# Patient Record
Sex: FEMALE | Race: ASIAN | URBAN - METROPOLITAN AREA
[De-identification: names, ages, dates, MRNs, and addresses within clinical notes are randomized per-mention and may not be internally consistent; named-entity substitution may affect disease eponyms.]

---

## 2019-06-21 ENCOUNTER — TRANSFERRED RECORDS (OUTPATIENT)
Dept: HEALTH INFORMATION MANAGEMENT | Facility: CLINIC | Age: 24
End: 2019-06-21

## 2019-09-19 ENCOUNTER — OFFICE VISIT (OUTPATIENT)
Dept: FAMILY MEDICINE | Facility: CLINIC | Age: 24
End: 2019-09-19
Payer: COMMERCIAL

## 2019-09-19 VITALS
SYSTOLIC BLOOD PRESSURE: 110 MMHG | BODY MASS INDEX: 42.69 KG/M2 | OXYGEN SATURATION: 98 % | DIASTOLIC BLOOD PRESSURE: 60 MMHG | HEIGHT: 62 IN | RESPIRATION RATE: 16 BRPM | WEIGHT: 232 LBS | TEMPERATURE: 98.3 F | HEART RATE: 87 BPM

## 2019-09-19 DIAGNOSIS — E66.01 MORBID OBESITY (H): ICD-10-CM

## 2019-09-19 DIAGNOSIS — Z11.3 SCREEN FOR STD (SEXUALLY TRANSMITTED DISEASE): ICD-10-CM

## 2019-09-19 DIAGNOSIS — Z00.00 ROUTINE GENERAL MEDICAL EXAMINATION AT A HEALTH CARE FACILITY: Primary | ICD-10-CM

## 2019-09-19 DIAGNOSIS — Z12.4 SCREENING FOR MALIGNANT NEOPLASM OF CERVIX: ICD-10-CM

## 2019-09-19 DIAGNOSIS — Z23 ENCOUNTER FOR IMMUNIZATION: ICD-10-CM

## 2019-09-19 DIAGNOSIS — N93.8 DUB (DYSFUNCTIONAL UTERINE BLEEDING): ICD-10-CM

## 2019-09-19 LAB
CHOLEST SERPL-MCNC: 158 MG/DL
GLUCOSE SERPL-MCNC: 88 MG/DL (ref 70–99)
HCG UR QL: NEGATIVE
HDLC SERPL-MCNC: 45 MG/DL
HGB BLD-MCNC: 12.3 G/DL (ref 11.7–15.7)
LDLC SERPL CALC-MCNC: 89 MG/DL
NONHDLC SERPL-MCNC: 113 MG/DL
TRIGL SERPL-MCNC: 122 MG/DL
TSH SERPL DL<=0.005 MIU/L-ACNC: 2.87 MU/L (ref 0.4–4)

## 2019-09-19 PROCEDURE — 80061 LIPID PANEL: CPT | Performed by: FAMILY MEDICINE

## 2019-09-19 PROCEDURE — 90686 IIV4 VACC NO PRSV 0.5 ML IM: CPT | Performed by: FAMILY MEDICINE

## 2019-09-19 PROCEDURE — 81025 URINE PREGNANCY TEST: CPT | Performed by: FAMILY MEDICINE

## 2019-09-19 PROCEDURE — 99385 PREV VISIT NEW AGE 18-39: CPT | Mod: 25 | Performed by: FAMILY MEDICINE

## 2019-09-19 PROCEDURE — 90471 IMMUNIZATION ADMIN: CPT | Performed by: FAMILY MEDICINE

## 2019-09-19 PROCEDURE — 90715 TDAP VACCINE 7 YRS/> IM: CPT | Performed by: FAMILY MEDICINE

## 2019-09-19 PROCEDURE — 82947 ASSAY GLUCOSE BLOOD QUANT: CPT | Performed by: FAMILY MEDICINE

## 2019-09-19 PROCEDURE — G0123 SCREEN CERV/VAG THIN LAYER: HCPCS | Performed by: FAMILY MEDICINE

## 2019-09-19 PROCEDURE — 87591 N.GONORRHOEAE DNA AMP PROB: CPT | Performed by: FAMILY MEDICINE

## 2019-09-19 PROCEDURE — 85018 HEMOGLOBIN: CPT | Performed by: FAMILY MEDICINE

## 2019-09-19 PROCEDURE — 36415 COLL VENOUS BLD VENIPUNCTURE: CPT | Performed by: FAMILY MEDICINE

## 2019-09-19 PROCEDURE — 84443 ASSAY THYROID STIM HORMONE: CPT | Performed by: FAMILY MEDICINE

## 2019-09-19 PROCEDURE — 90472 IMMUNIZATION ADMIN EACH ADD: CPT | Performed by: FAMILY MEDICINE

## 2019-09-19 PROCEDURE — 87491 CHLMYD TRACH DNA AMP PROBE: CPT | Performed by: FAMILY MEDICINE

## 2019-09-19 PROCEDURE — 87389 HIV-1 AG W/HIV-1&-2 AB AG IA: CPT | Performed by: FAMILY MEDICINE

## 2019-09-19 RX ORDER — IBUPROFEN 200 MG
200 TABLET ORAL EVERY 4 HOURS PRN
COMMUNITY

## 2019-09-19 SDOH — HEALTH STABILITY: MENTAL HEALTH: HOW OFTEN DO YOU HAVE A DRINK CONTAINING ALCOHOL?: NEVER

## 2019-09-19 ASSESSMENT — MIFFLIN-ST. JEOR: SCORE: 1760.6

## 2019-09-19 NOTE — PATIENT INSTRUCTIONS
Preventive Health Recommendations  Female Ages 21 to 25     Yearly exam:     See your health care provider every year in order to  o Review health changes.   o Discuss preventive care.    o Review your medicines if your doctor has prescribed any.      You should be tested each year for STDs (sexually transmitted diseases).       Talk to your provider about how often you should have cholesterol testing.      Get a Pap test every three years. If you have an abnormal result, your doctor may have you test more often.      If you are at risk for diabetes, you should have a diabetes test (fasting glucose).     Shots:     Get a flu shot each year.     Get a tetanus shot every 10 years.     Consider getting the shot (vaccine) that prevents cervical cancer (Gardasil).    Nutrition:     Eat at least 5 servings of fruits and vegetables each day.    Eat whole-grain bread, whole-wheat pasta and brown rice instead of white grains and rice.    Get adequate Calcium and Vitamin D.     Lifestyle    Exercise at least 150 minutes a week each week (30 minutes a day, 5 days a week). This will help you control your weight and prevent disease.    Limit alcohol to one drink per day.    No smoking.     Wear sunscreen to prevent skin cancer.    See your dentist every six months for an exam and cleaning.  Patient Education     Human Papillomavirus Type 6 L1 Capsid Protein antigen, Human Papillomavirus Type 11 L1 Capsid Protein antigen, Human Papillomavirus Type 16 L1 Capsid Protein antigen, Human Papillomavirus Type 18 L1 Capsid Protein antigen Suspension for injection  What is this medicine?  HUMAN PAPILLOMAVIRUS VACCINE ([facility]) is a vaccine. It is used to prevent infections of four types of the human papillomavirus. In women, the vaccine may lower your risk of getting cervical, vaginal, vulvar, or anal cancer and genital warts. In men, the vaccine may lower your risk of getting genital warts and anal  cancer. You cannot get these diseases from the vaccine. This vaccine does not treat these diseases.  This medicine may be used for other purposes; ask your health care provider or pharmacist if you have questions.  What should I tell my health care provider before I take this medicine?  They need to know if you have any of these conditions:  fever or infection  hemophilia  HIV infection or AIDS  immune system problems  low platelet count  an unusual reaction to Human Papillomavirus Vaccine, yeast, other medicines, foods, dyes, or preservatives  pregnant or trying to get pregnant  breast-feeding  How should I use this medicine?  This vaccine is for injection in a muscle on your upper arm or thigh. It is given by a health care professional. You will be observed for 15 minutes after each dose. Sometimes, fainting happens after the vaccine is given. You may be asked to sit or lie down during the 15 minutes. Three doses are given. The second dose is given 2 months after the first dose. The last dose is given 4 months after the second dose.  A copy of a Vaccine Information Statement will be given before each vaccination. Read this sheet carefully each time. The sheet may change frequently.  Talk to your pediatrician regarding the use of this medicine in children. While this drug may be prescribed for children as young as 9 years of age for selected conditions, precautions do apply.  Overdosage: If you think you have taken too much of this medicine contact a poison control center or emergency room at once.  NOTE: This medicine is only for you. Do not share this medicine with others.  What if I miss a dose?  All 3 doses of the vaccine should be given within 6 months. Remember to keep appointments for follow-up doses. Your health care provider will tell you when to return for the next vaccine. Ask your health care professional for advice if you are unable to keep an appointment or miss a scheduled dose.  What may interact  with this medicine?  other vaccines  This list may not describe all possible interactions. Give your health care provider a list of all the medicines, herbs, non-prescription drugs, or dietary supplements you use. Also tell them if you smoke, drink alcohol, or use illegal drugs. Some items may interact with your medicine.  What should I watch for while using this medicine?  This vaccine may not fully protect everyone. Continue to have regular pelvic exams and cervical or anal cancer screenings as directed by your doctor.  The Human Papillomavirus is a sexually transmitted disease. It can be passed by any kind of sexual activity that involves genital contact. The vaccine works best when given before you have any contact with the virus. Many people who have the virus do not have any signs or symptoms.  Tell your doctor or health care professional if you have any reaction or unusual symptom after getting the vaccine.  What side effects may I notice from receiving this medicine?  Side effects that you should report to your doctor or health care professional as soon as possible:  allergic reactions like skin rash, itching or hives, swelling of the face, lips, or tongue  breathing problems  feeling faint or lightheaded, falls  Side effects that usually do not require medical attention (report to your doctor or health care professional if they continue or are bothersome):  cough  dizziness  fever  headache  nausea  redness, warmth, swelling, pain, or itching at site where injected  This list may not describe all possible side effects. Call your doctor for medical advice about side effects. You may report side effects to FDA at 8-350-FDA-0533.  Where should I keep my medicine?  This drug is given in a hospital or clinic and will not be stored at home.  NOTE:This sheet is a summary. It may not cover all possible information. If you have questions about this medicine, talk to your doctor, pharmacist, or health care provider.  Copyright  2016 [employer] Standard

## 2019-09-19 NOTE — PROGRESS NOTES
SUBJECTIVE:   CC: Holly Kinsey is an 24 year old woman who presents for preventive health visit.     Healthy Habits:    Do you get at least three servings of calcium containing foods daily (dairy, green leafy vegetables, etc.)? yes and no, taking calcium and/or vitamin D supplement: no    Amount of exercise or daily activities, outside of work: none    Problems taking medications regularly not applicable    Medication side effects: N/A    Have you had an eye exam in the past two years? yes    Do you see a dentist twice per year? yes    Do you have sleep apnea, excessive snoring or daytime drowsiness?no      Today's PHQ-2 Score:   PHQ-2 ( 1999 Pfizer) 9/19/2019   Q1: Little interest or pleasure in doing things 0   Q2: Feeling down, depressed or hopeless 0   PHQ-2 Score 0       Abuse: Current or Past(Physical, Sexual or Emotional)- No  Do you feel safe in your environment? Yes    Social History     Tobacco Use     Smoking status: Light Tobacco Smoker     Smokeless tobacco: Never Used     Tobacco comment: 1-2,vaping   Substance Use Topics     Alcohol use: Never     Frequency: Never     If you drink alcohol do you typically have >3 drinks per day or >7 drinks per week? No                     Reviewed orders with patient.  Reviewed health maintenance and updated orders accordingly - Yes  Lab work is in process  Labs reviewed in EPIC  BP Readings from Last 3 Encounters:   09/19/19 110/60    Wt Readings from Last 3 Encounters:   09/19/19 105.2 kg (232 lb)                  Patient Active Problem List   Diagnosis     Morbid obesity (H)     DUB (dysfunctional uterine bleeding)     History reviewed. No pertinent surgical history.    Social History     Tobacco Use     Smoking status: Light Tobacco Smoker     Smokeless tobacco: Never Used     Tobacco comment: 1-2,vaping   Substance Use Topics     Alcohol use: Never     Frequency: Never     Family History   Problem Relation Age of Onset     Hypertension Father      Diabetes  "Father      Hyperlipidemia Father      Breast Cancer No family hx of      Colon Cancer No family hx of          Current Outpatient Medications   Medication Sig Dispense Refill     ibuprofen (ADVIL/MOTRIN) 200 MG tablet Take 200 mg by mouth every 4 hours as needed for mild pain       No Known Allergies  No lab results found.     Mammogram not appropriate for this patient based on age.    Pertinent mammograms are reviewed under the imaging tab.  History of abnormal Pap smear: NO - age 30- 65 PAP every 3 years recommended     Reviewed and updated as needed this visit by clinical staff  Tobacco  Allergies  Meds  Problems  Med Hx  Surg Hx  Fam Hx  Soc Hx          Reviewed and updated as needed this visit by Provider  Tobacco  Allergies  Meds  Problems  Med Hx  Surg Hx  Fam Hx        History reviewed. No pertinent past medical history.   History reviewed. No pertinent surgical history.    ROS:  CONSTITUTIONAL: NEGATIVE for fever, chills, change in weight  INTEGUMENTARU/SKIN: NEGATIVE for worrisome rashes, moles or lesions  EYES: NEGATIVE for vision changes or irritation  ENT: NEGATIVE for ear, mouth and throat problems  RESP: NEGATIVE for significant cough or SOB  BREAST: NEGATIVE for masses, tenderness or discharge  CV: NEGATIVE for chest pain, palpitations or peripheral edema  GI: NEGATIVE for nausea, abdominal pain, heartburn, or change in bowel habits  : NEGATIVE for unusual urinary or vaginal symptoms. Periods are  Has been spotting since august  Has been under stress as Lost her DAD 2 months ago  Is also planning Pregnancy  MUSCULOSKELETAL: NEGATIVE for significant arthralgias or myalgia  NEURO: NEGATIVE for weakness, dizziness or paresthesias  PSYCHIATRIC: NEGATIVE for changes in mood or affect    OBJECTIVE:   /60   Pulse 87   Temp 98.3  F (36.8  C) (Oral)   Resp 16   Ht 1.575 m (5' 2\")   Wt 105.2 kg (232 lb)   SpO2 98%   Breastfeeding? No   BMI 42.43 kg/m     LMP light period " present  EXAM:  GENERAL: healthy, alert and no distress  EYES: Eyes grossly normal to inspection, PERRL and conjunctivae and sclerae normal  HENT: ear canals and TM's normal, nose and mouth without ulcers or lesions  NECK: no adenopathy, no asymmetry, masses, or scars and thyroid normal to palpation  RESP: lungs clear to auscultation - no rales, rhonchi or wheezes  BREAST: normal without masses, tenderness or nipple discharge and no palpable axillary masses or adenopathy  CV: regular rate and rhythm, normal S1 S2, no S3 or S4, no murmur, click or rub, no peripheral edema and peripheral pulses strong  ABDOMEN: soft, nontender, no hepatosplenomegaly, no masses and bowel sounds normal   (female): normal female external genitalia, normal urethral meatus, vaginal mucosa pink, moist, well rugated, and normal cervix/adnexa/uterus without masses or discharge  MS: no gross musculoskeletal defects noted, no edema  SKIN: no suspicious lesions or rashes  NEURO: Normal strength and tone, mentation intact and speech normal  PSYCH: mentation appears normal, affect normal/bright    Diagnostic Test Results:  Labs reviewed in Epic  Pending     ASSESSMENT/PLAN:   1. Routine general medical examination at a health care facility    - Glucose  - Lipid panel reflex to direct LDL Non-fasting    2. Morbid obesity (H)  Low oscar diet  Discussed   Exercise 5 days a week 30 minutes    3. DUB (dysfunctional uterine bleeding)  Pending   If  not better  1 month follow up   - TSH with free T4 reflex  - Hemoglobin  - HCG Qual, Urine ([id number])    4. Screening for malignant neoplasm of cervix    - Pap imaged thin layer screen only - recommended age 21 - 24 years    5. Encounter for immunization  Advised flu shot  Deferred gardasil as Pt planning Pregnancy  Discussed to get This when using contraception  -      ADMIN VACCINE, FIRST    6. Screen for STD (sexually transmitted disease)    - NEISSERIA GONORRHOEA PCR  - CHLAMYDIA TRACHOMATIS PCR  - HIV  "Screening    COUNSELING:   Reviewed preventive health counseling, as reflected in patient instructions       Regular exercise       Contraception       Folic Acid Counseling       Safe sex practices/STD prevention    Estimated body mass index is 42.43 kg/m  as calculated from the following:    Height as of this encounter: 1.575 m (5' 2\").    Weight as of this encounter: 105.2 kg (232 lb).    Weight management plan: Discussed healthy diet and exercise guidelines     reports that she has been smoking.  She has never used smokeless tobacco.      Counseling Resources:  ATP IV Guidelines  Pooled Cohorts Equation Calculator  Breast Cancer Risk Calculator  FRAX Risk Assessment  ICSI Preventive Guidelines  Dietary Guidelines for Americans, 2010  USDA's MyPlate  ASA Prophylaxis  Lung CA Screening    Bhavani Paniagua MD  [facility]  "

## 2019-09-20 LAB
C TRACH DNA SPEC QL NAA+PROBE: NEGATIVE
HIV 1+2 AB+HIV1 P24 AG SERPL QL IA: NONREACTIVE
N GONORRHOEA DNA SPEC QL NAA+PROBE: NEGATIVE
SPECIMEN SOURCE: NORMAL
SPECIMEN SOURCE: NORMAL

## 2019-09-24 LAB
COPATH REPORT: NORMAL
PAP: NORMAL

## 2019-09-30 ENCOUNTER — HEALTH MAINTENANCE LETTER (OUTPATIENT)
Age: 24
End: 2019-09-30

## 2021-01-15 ENCOUNTER — HEALTH MAINTENANCE LETTER (OUTPATIENT)
Age: 26
End: 2021-01-15

## 2021-10-24 ENCOUNTER — HEALTH MAINTENANCE LETTER (OUTPATIENT)
Age: 26
End: 2021-10-24

## 2022-02-13 ENCOUNTER — HEALTH MAINTENANCE LETTER (OUTPATIENT)
Age: 27
End: 2022-02-13

## 2022-10-16 ENCOUNTER — HEALTH MAINTENANCE LETTER (OUTPATIENT)
Age: 27
End: 2022-10-16

## 2023-03-26 ENCOUNTER — HEALTH MAINTENANCE LETTER (OUTPATIENT)
Age: 28
End: 2023-03-26